# Patient Record
Sex: FEMALE | Race: BLACK OR AFRICAN AMERICAN | NOT HISPANIC OR LATINO | Employment: UNEMPLOYED | URBAN - METROPOLITAN AREA
[De-identification: names, ages, dates, MRNs, and addresses within clinical notes are randomized per-mention and may not be internally consistent; named-entity substitution may affect disease eponyms.]

---

## 2023-09-18 DIAGNOSIS — Z34.81 SUPERVISION OF NORMAL INTRAUTERINE PREGNANCY IN MULTIGRAVIDA, FIRST TRIMESTER (HHS-HCC): ICD-10-CM

## 2023-09-18 LAB
ABO GROUP (TYPE) IN BLOOD: NORMAL
AMPHETAMINE (PRESENCE) IN URINE BY SCREEN METHOD: NORMAL
ANTIBODY SCREEN: NORMAL
BARBITURATES PRESENCE IN URINE BY SCREEN METHOD: NORMAL
BENZODIAZEPINE (PRESENCE) IN URINE BY SCREEN METHOD: NORMAL
CANNABINOIDS IN URINE BY SCREEN METHOD: NORMAL
COCAINE (PRESENCE) IN URINE BY SCREEN METHOD: NORMAL
DRUG SCREEN COMMENT URINE: NORMAL
ERYTHROCYTE DISTRIBUTION WIDTH (RATIO) BY AUTOMATED COUNT: 17.6 % (ref 11.5–14.5)
ERYTHROCYTE MEAN CORPUSCULAR HEMOGLOBIN CONCENTRATION (G/DL) BY AUTOMATED: 31.1 G/DL (ref 32–36)
ERYTHROCYTE MEAN CORPUSCULAR VOLUME (FL) BY AUTOMATED COUNT: 83 FL (ref 80–100)
ERYTHROCYTES (10*6/UL) IN BLOOD BY AUTOMATED COUNT: 3.97 X10E12/L (ref 4–5.2)
ESTIMATED AVERAGE GLUCOSE FOR HBA1C: 126 MG/DL
FENTANYL URINE: NORMAL
FERRITIN, PREGNANCY: 10 UG/L
FOLATE, SERUM, PREGNANCY: >24 NG/ML
HEMATOCRIT (%) IN BLOOD BY AUTOMATED COUNT: 32.8 % (ref 36–46)
HEMOGLOBIN (G/DL) IN BLOOD: 10.2 G/DL (ref 12–16)
HEMOGLOBIN A1C/HEMOGLOBIN TOTAL IN BLOOD: 6 %
HEPATITIS B VIRUS SURFACE AG PRESENCE IN SERUM: NONREACTIVE
HEPATITIS C VIRUS AB PRESENCE IN SERUM: NONREACTIVE
HIV 1/ 2 AG/AB SCREEN: NONREACTIVE
IRON (UG/DL) IN SER/PLAS IN PREGNANCY: 44 UG/DL
IRON BINDING CAPACITY (UG/DL) IN PREGNANCY: 432 UG/DL
IRON SATURATION (%) IN PREGNANCY: 10 %
LEUKOCYTES (10*3/UL) IN BLOOD BY AUTOMATED COUNT: 5.6 X10E9/L (ref 4.4–11.3)
OPIATES (PRESENCE) IN URINE BY SCREEN METHOD: NORMAL
OXYCODONE (PRESENCE) IN URINE BY SCREEN METHOD: NORMAL
PHENCYCLIDINE (PRESENCE) IN URINE BY SCREEN METHOD: NORMAL
PLATELETS (10*3/UL) IN BLOOD AUTOMATED COUNT: 348 X10E9/L (ref 150–450)
REFLEX ADDED, ANEMIA PANEL: ABNORMAL
RH FACTOR: NORMAL
RUBELLA VIRUS IGG AB: POSITIVE
SYPHILIS TOTAL AB: NONREACTIVE
VITAMIN B12, PREGNANCY: 592 PG/ML

## 2023-09-19 LAB
CHLAMYDIA TRACH., AMPLIFIED: NEGATIVE
HEMOGLOBIN A2: 2.4 %
HEMOGLOBIN A: 96.8 %
HEMOGLOBIN F: 0.8 %
HEMOGLOBIN IDENTIFICATION INTERPRETATION: NORMAL
N. GONORRHEA, AMPLIFIED: NEGATIVE
PATH REVIEW-HGB IDENTIFICATION: NORMAL

## 2023-09-20 LAB — TRICHOMONAS VAGINALIS: POSITIVE

## 2023-09-21 LAB — URINE CULTURE: ABNORMAL

## 2023-10-01 DIAGNOSIS — A59.01 TRICHOMONAL VAGINITIS: Primary | ICD-10-CM

## 2023-10-01 RX ORDER — METRONIDAZOLE 500 MG/1
500 TABLET ORAL 2 TIMES DAILY
Qty: 14 TABLET | Refills: 0 | Status: SHIPPED | OUTPATIENT
Start: 2023-10-01 | End: 2023-10-08

## 2023-10-02 ENCOUNTER — TELEPHONE (OUTPATIENT)
Dept: OBSTETRICS AND GYNECOLOGY | Facility: CLINIC | Age: 33
End: 2023-10-02
Payer: MEDICAID

## 2023-10-02 NOTE — TELEPHONE ENCOUNTER
Attempted to reach patient to notify of normal pap results and may repeat in 3 years per Dr. Infante task. Positive for BV and also positive for Trich on pap so flagyl was sent in to patient pharmacy. Left  message on voicemail for patient to return call to office.    Patient not returning multiple phone calls. [facility]

## 2023-10-11 PROBLEM — O26.899 RH NEGATIVE, ANTEPARTUM (HHS-HCC): Status: ACTIVE | Noted: 2023-10-11

## 2023-10-11 PROBLEM — Z34.81 MULTIGRAVIDA IN FIRST TRIMESTER (HHS-HCC): Status: ACTIVE | Noted: 2023-10-11

## 2023-10-11 PROBLEM — Z67.91 RH NEGATIVE, ANTEPARTUM (HHS-HCC): Status: ACTIVE | Noted: 2023-10-11

## 2023-10-11 PROBLEM — O99.810 ABNORMAL MATERNAL GLUCOSE TOLERANCE, ANTEPARTUM (HHS-HCC): Status: ACTIVE | Noted: 2023-10-11

## 2023-10-11 PROBLEM — K59.09 CHRONIC CONSTIPATION: Status: ACTIVE | Noted: 2023-10-11

## 2023-10-11 PROBLEM — N39.0 UTI (URINARY TRACT INFECTION): Status: ACTIVE | Noted: 2023-10-11

## 2023-10-11 PROBLEM — K21.9 CHRONIC GERD: Status: ACTIVE | Noted: 2023-10-11

## 2023-10-11 RX ORDER — OXYCODONE AND ACETAMINOPHEN 5; 325 MG/1; MG/1
TABLET ORAL
COMMUNITY
Start: 2022-11-23

## 2023-10-11 RX ORDER — DOXYLAMINE SUCCINATE 25 MG/1
25 TABLET ORAL NIGHTLY
COMMUNITY
Start: 2023-09-18

## 2023-10-11 RX ORDER — PNV NO.95/FERROUS FUM/FOLIC AC 28MG-0.8MG
1 TABLET ORAL DAILY
COMMUNITY
Start: 2023-09-19

## 2023-10-11 RX ORDER — ONDANSETRON 4 MG/1
TABLET, ORALLY DISINTEGRATING ORAL
COMMUNITY
Start: 2022-11-23

## 2023-10-11 RX ORDER — PYRIDOXINE HCL (VITAMIN B6) 100 MG
100 TABLET ORAL DAILY
COMMUNITY
Start: 2023-09-18

## 2023-10-11 RX ORDER — AMOXICILLIN AND CLAVULANATE POTASSIUM 250; 125 MG/1; MG/1
1 TABLET, FILM COATED ORAL 3 TIMES DAILY
COMMUNITY
Start: 2023-09-25